# Patient Record
Sex: MALE | Race: WHITE | NOT HISPANIC OR LATINO | Employment: OTHER | URBAN - METROPOLITAN AREA
[De-identification: names, ages, dates, MRNs, and addresses within clinical notes are randomized per-mention and may not be internally consistent; named-entity substitution may affect disease eponyms.]

---

## 2022-09-05 ENCOUNTER — HOSPITAL ENCOUNTER (EMERGENCY)
Facility: HOSPITAL | Age: 77
Discharge: HOME | End: 2022-09-05
Attending: EMERGENCY MEDICINE | Admitting: EMERGENCY MEDICINE

## 2022-09-05 ENCOUNTER — APPOINTMENT (OUTPATIENT)
Dept: GENERAL RADIOLOGY | Facility: HOSPITAL | Age: 77
End: 2022-09-05

## 2022-09-05 VITALS
HEART RATE: 105 BPM | HEIGHT: 70 IN | RESPIRATION RATE: 18 BRPM | DIASTOLIC BLOOD PRESSURE: 75 MMHG | BODY MASS INDEX: 26.48 KG/M2 | WEIGHT: 185 LBS | SYSTOLIC BLOOD PRESSURE: 117 MMHG | TEMPERATURE: 97.8 F | OXYGEN SATURATION: 95 %

## 2022-09-05 DIAGNOSIS — G89.29 OTHER CHRONIC PAIN: Primary | ICD-10-CM

## 2022-09-05 DIAGNOSIS — M17.0 OSTEOARTHRITIS OF BOTH KNEES, UNSPECIFIED OSTEOARTHRITIS TYPE: ICD-10-CM

## 2022-09-05 PROCEDURE — 99283 EMERGENCY DEPT VISIT LOW MDM: CPT

## 2022-09-05 PROCEDURE — 73130 X-RAY EXAM OF HAND: CPT

## 2022-09-05 PROCEDURE — 73562 X-RAY EXAM OF KNEE 3: CPT

## 2022-09-05 RX ORDER — PREDNISONE 20 MG/1
60 TABLET ORAL DAILY
Qty: 15 TABLET | Refills: 0 | Status: SHIPPED | OUTPATIENT
Start: 2022-09-05 | End: 2022-09-10

## 2022-09-05 RX ORDER — TAMSULOSIN HYDROCHLORIDE 0.4 MG/1
1 CAPSULE ORAL DAILY
COMMUNITY

## 2022-09-05 RX ORDER — LISINOPRIL 20 MG/1
20 TABLET ORAL DAILY
COMMUNITY
End: 2023-03-19

## 2022-09-05 RX ORDER — ACETAMINOPHEN 500 MG
1000 TABLET ORAL ONCE
Status: COMPLETED | OUTPATIENT
Start: 2022-09-05 | End: 2022-09-05

## 2022-09-05 RX ORDER — PRAVASTATIN SODIUM 40 MG
40 TABLET ORAL DAILY
COMMUNITY
End: 2023-03-19

## 2022-09-05 RX ADMIN — ACETAMINOPHEN 1000 MG: 500 TABLET ORAL at 16:15

## 2022-09-05 NOTE — ED PROVIDER NOTES
Subjective   77-year-old male presents with right knee pain.  Also notes left hand pain further and or questioning.  Patient states that he has had a few falls recently.  He did not have a fall today but states the pain in his knee keeps getting worse to the point where he is having trouble walking.  Reports he has history of arthritis, used to be on hydrocodone for this, but has been off of this for several years.  Reports he has not taken anything for the pain recently.  No medications today.  No numbness or tingling.  Patient has crutches and walker but states his apartment is too crowded to use them so he usually does not.  He has otherwise felt well.          Review of Systems   All other systems reviewed and are negative.      Past Medical History:   Diagnosis Date   • Diabetes mellitus (HCC)    • Hyperlipidemia    • Hypertension        No Known Allergies    Past Surgical History:   Procedure Laterality Date   • APPENDECTOMY     • CHOLECYSTECTOMY         History reviewed. No pertinent family history.    Social History     Socioeconomic History   • Marital status:    Tobacco Use   • Smoking status: Current Every Day Smoker     Packs/day: 0.50   Substance and Sexual Activity   • Alcohol use: No   • Drug use: No   • Sexual activity: Defer           Objective   Physical Exam  Constitutional:       General: He is not in acute distress.     Appearance: Normal appearance. He is not ill-appearing, toxic-appearing or diaphoretic.   HENT:      Head: Normocephalic and atraumatic.      Nose: Nose normal. No congestion.      Mouth/Throat:      Mouth: Mucous membranes are moist.      Pharynx: Oropharynx is clear.   Eyes:      Extraocular Movements: Extraocular movements intact.      Pupils: Pupils are equal, round, and reactive to light.   Cardiovascular:      Rate and Rhythm: Normal rate and regular rhythm.      Pulses: Normal pulses.      Heart sounds: Normal heart sounds.   Pulmonary:      Effort: Pulmonary effort  is normal. No respiratory distress.      Breath sounds: Normal breath sounds.   Abdominal:      General: There is no distension.      Palpations: Abdomen is soft.      Tenderness: There is no abdominal tenderness.   Musculoskeletal:      Comments: No midline neck or back tenderness.  No tenderness to chest wall.  Tenderness to dorsum of left hand without any significant swelling, ecchymosis, deformity.   strength of left hand slightly decreased secondary to pain but otherwise is neurovascular intact.  No tenderness over wrist, elbow, shoulder.  Right upper extremity within normal limits.  Bilateral lower extremities with chronic bony changes to knees, limited range of motion of knees, but normal strength and sensation distally.  2+ PT pulses bilaterally.   Skin:     General: Skin is warm and dry.      Comments: Intact   Neurological:      General: No focal deficit present.      Mental Status: He is alert and oriented to person, place, and time. Mental status is at baseline.      Sensory: No sensory deficit.      Motor: No weakness.   Psychiatric:         Mood and Affect: Mood normal.         Behavior: Behavior normal.         Thought Content: Thought content normal.         Judgment: Judgment normal.         Procedures           ED Course  ED Course as of 09/05/22 1654   Mon Sep 05, 2022   1613 Imaging negative for acute injury.  Patient is neurovascular intact.  Sounds like this is more an exacerbation of a chronic problem.  Patient says he cannot take NSAIDs.  Recommend Tylenol but we will also prescribe short course of steroids for anti-inflammatory effects.  Did discuss risk of hyperglycemia with him.  Advised orthopedic surgery follow-up for his osteoarthritis of knees.  Discussed expected course and return precautions. [TD]      ED Course User Index  [TD] Derrek Segovia MD                                           [facility]    Final diagnoses:   Other chronic pain   Osteoarthritis of both knees, unspecified  osteoarthritis type       ED Disposition  ED Disposition     ED Disposition   Discharge    Condition   Stable    Comment   --             Wilfrid Vitale MD  [address] [facility] [address]  [place] [address] [phone]    Schedule an appointment as soon as possible for a visit in 1 week           Medication List      New Prescriptions    predniSONE 20 MG tablet  Commonly known as: DELTASONE  Take 3 tablets by mouth Daily for 5 days.           Where to Get Your Medications      These medications were sent to [facility]/pharmacy #[id number] - [place], [place] - [address] [phone] [facility] [phone]   [address] [facility] [address]    Phone: [phone]   · predniSONE 20 MG tablet          Derrek Segovia MD  09/05/22 5066

## 2023-03-15 ENCOUNTER — APPOINTMENT (OUTPATIENT)
Dept: GENERAL RADIOLOGY | Facility: HOSPITAL | Age: 78
End: 2023-03-15
Payer: MEDICARE

## 2023-03-15 ENCOUNTER — HOSPITAL ENCOUNTER (EMERGENCY)
Facility: HOSPITAL | Age: 78
Discharge: HOME | End: 2023-03-15
Attending: EMERGENCY MEDICINE | Admitting: EMERGENCY MEDICINE
Payer: MEDICARE

## 2023-03-15 VITALS
HEART RATE: 95 BPM | HEIGHT: 68 IN | DIASTOLIC BLOOD PRESSURE: 76 MMHG | OXYGEN SATURATION: 96 % | SYSTOLIC BLOOD PRESSURE: 147 MMHG | TEMPERATURE: 98.2 F | WEIGHT: 210.9 LBS | BODY MASS INDEX: 31.96 KG/M2 | RESPIRATION RATE: 18 BRPM

## 2023-03-15 DIAGNOSIS — S80.01XA CONTUSION OF RIGHT KNEE, INITIAL ENCOUNTER: ICD-10-CM
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
## 2023-03-15 PROCEDURE — 73562 X-RAY EXAM OF KNEE 3: CPT

## 2023-03-15 PROCEDURE — 99284 EMERGENCY DEPT VISIT MOD MDM: CPT

## 2023-03-15 RX ORDER — HYDROCODONE BITARTRATE AND ACETAMINOPHEN 5; 325 MG/1; MG/1
1 TABLET ORAL ONCE
Status: COMPLETED | OUTPATIENT
Start: 2023-03-15 | End: 2023-03-15

## 2023-03-15 RX ADMIN — HYDROCODONE BITARTRATE AND ACETAMINOPHEN 1 TABLET: 5; 325 TABLET ORAL at 12:37

## 2023-03-15 NOTE — ED PROVIDER NOTES
Subjective   History of Present Illness  Patient presents complaining of a fall that happened yesterday around 4 PM.  Patient was out walking on the sidewalk and said his right knee gave out and he fell straight down.  Patient came down on his knees.  Patient denies any head trauma or other injury.  Patient was able to get back up and reports pain with walking now.  No previous knee surgery, fracture, or dislocation.        Review of Systems   All other systems reviewed and are negative.      Past Medical History:   Diagnosis Date   • Diabetes mellitus (HCC)    • Hyperlipidemia    • Hypertension        No Known Allergies    Past Surgical History:   Procedure Laterality Date   • APPENDECTOMY     • CHOLECYSTECTOMY         History reviewed. No pertinent family history.    Social History     Socioeconomic History   • Marital status:    Tobacco Use   • Smoking status: Every Day     Packs/day: 0.50     Types: Cigarettes   Substance and Sexual Activity   • Alcohol use: No   • Drug use: No   • Sexual activity: Defer           Objective   Physical Exam  Vitals and nursing note reviewed.   HENT:      Head: Normocephalic and atraumatic.      Right Ear: External ear normal.      Left Ear: External ear normal.   Eyes:      Conjunctiva/sclera: Conjunctivae normal.   Neck:      Comments: No cervical, thoracic, or lumbar spinal tenderness.  Cardiovascular:      Rate and Rhythm: Normal rate and regular rhythm.      Pulses:           Radial pulses are 2+ on the right side and 2+ on the left side.        Dorsalis pedis pulses are 2+ on the right side and 2+ on the left side.        Posterior tibial pulses are 2+ on the right side and 2+ on the left side.      Heart sounds: Normal heart sounds.   Pulmonary:      Effort: Pulmonary effort is normal.      Breath sounds: Normal breath sounds.   Musculoskeletal:      Cervical back: Normal range of motion and neck supple.      Comments: Tenderness to bilateral anterior knees overlying  the patella.  Right knee has a small 2 x 2 centimeter area of superficial abrasion in the distal anterior knee.  Good catch point felt with anterior posterior drawer bilaterally.  Patient has full range of motion of bilateral ankles, knees, and hips.   Skin:     General: Skin is warm and dry.      Capillary Refill: Capillary refill takes 2 to 3 seconds.   Neurological:      Mental Status: He is alert and oriented to person, place, and time.      Sensory: No sensory deficit.      Motor: No weakness.   Psychiatric:         Mood and Affect: Mood normal.         Behavior: Behavior normal.         Procedures           ED Course                                           Medical Decision Making  ddx -contusion, sprain, strain, abrasion, dislocation, fracture    XR Knee 3 View Left    Result Date: 3/15/2023  1.  No acute osseous abnormality. 2.  Tricompartment degenerative arthropathy, greatest at the patellofemoral joint.  This report was finalized on 3/15/2023 12:45 PM by Dr. Donnell Baer MD.      XR Knee 3 View Right    Result Date: 3/15/2023  1. No acute osseous abnormality. 2. Large knee joint effusion. 3. Advanced degenerative arthropathy.  This report was finalized on 3/15/2023 12:47 PM by Dr. Donnell Baer MD.      1320 Pt seen again prior to d/c.  Imaging reviewed and are unremarkable.   All questions personally answered at the bedside and all d/c instructions personally reviewed with pt.  Discussed the importance of close outpt. f/u and pt. understands this and agrees to do so.  Pt agrees to return to ED immediately for any new, persistent, or worsening symptoms.    EMR Dragon/Transcription disclaimer:  Much of this encounter note is an electronic transcription/translation of spoken language to printed text, aka voice recognition.  The electronic translation of spoken language may permit erroneous or at times nonsensical words or phrases to be inadvertently transcribed; although I have reviewed the note  for such errors, some may still exist so please interpret based on surrounding text content.      Amount and/or Complexity of Data Reviewed  Radiology: ordered.      Risk  Prescription drug management.          Final diagnoses:   Fall, initial encounter   Contusion of right knee, initial encounter       ED Disposition  ED Disposition     ED Disposition   Discharge    Condition   Stable    Comment   --             Genaro Mae MD  [address] [facility] [address] [facility] [address] [facility]  [place] [address]  [phone]    In 3 days           Medication List      No changes were made to your prescriptions during this visit.          Evan Ruiz MD  03/15/23 3713

## 2023-03-19 ENCOUNTER — HOSPITAL ENCOUNTER (EMERGENCY)
Facility: HOSPITAL | Age: 78
Discharge: HOME | End: 2023-03-19
Attending: EMERGENCY MEDICINE | Admitting: EMERGENCY MEDICINE
Payer: MEDICARE

## 2023-03-19 ENCOUNTER — APPOINTMENT (OUTPATIENT)
Dept: GENERAL RADIOLOGY | Facility: HOSPITAL | Age: 78
End: 2023-03-19
Payer: MEDICARE

## 2023-03-19 ENCOUNTER — APPOINTMENT (OUTPATIENT)
Dept: CT IMAGING | Facility: HOSPITAL | Age: 78
End: 2023-03-19
Payer: MEDICARE

## 2023-03-19 VITALS
BODY MASS INDEX: 30.77 KG/M2 | DIASTOLIC BLOOD PRESSURE: 81 MMHG | HEIGHT: 68 IN | HEART RATE: 91 BPM | WEIGHT: 203 LBS | TEMPERATURE: 98 F | RESPIRATION RATE: 18 BRPM | OXYGEN SATURATION: 99 % | SYSTOLIC BLOOD PRESSURE: 136 MMHG

## 2023-03-19 DIAGNOSIS — M79.18 MUSCULOSKELETAL PAIN: Primary | ICD-10-CM
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
## 2023-03-19 PROCEDURE — 99284 EMERGENCY DEPT VISIT MOD MDM: CPT

## 2023-03-19 PROCEDURE — 72131 CT LUMBAR SPINE W/O DYE: CPT

## 2023-03-19 PROCEDURE — 72128 CT CHEST SPINE W/O DYE: CPT

## 2023-03-19 PROCEDURE — 72125 CT NECK SPINE W/O DYE: CPT

## 2023-03-19 PROCEDURE — 70450 CT HEAD/BRAIN W/O DYE: CPT

## 2023-03-19 PROCEDURE — 73562 X-RAY EXAM OF KNEE 3: CPT

## 2023-03-19 RX ORDER — PRAVASTATIN SODIUM 20 MG
1 TABLET ORAL DAILY
COMMUNITY
Start: 2022-10-20

## 2023-03-19 RX ORDER — HYDROCODONE BITARTRATE AND ACETAMINOPHEN 5; 325 MG/1; MG/1
0.5 TABLET ORAL ONCE
Status: COMPLETED | OUTPATIENT
Start: 2023-03-19 | End: 2023-03-19

## 2023-03-19 RX ORDER — LISINOPRIL 5 MG/1
1 TABLET ORAL DAILY
COMMUNITY
Start: 2023-03-05

## 2023-03-19 RX ORDER — IBUPROFEN 400 MG/1
400 TABLET ORAL EVERY 8 HOURS PRN
Qty: 21 TABLET | Refills: 0 | Status: SHIPPED | OUTPATIENT
Start: 2023-03-19 | End: 2023-03-22 | Stop reason: SDUPTHER

## 2023-03-19 RX ADMIN — HYDROCODONE BITARTRATE AND ACETAMINOPHEN 0.5 TABLET: 5; 325 TABLET ORAL at 13:43

## 2023-03-19 NOTE — ED PROVIDER NOTES
Subjective   History of Present Illness  77-year-old male past medical history significant for hypertension hyperlipidemia and diabetes who presents emergency room via ambulance secondary to back pain.  Patient states that he fell several days ago on his knees and since that time he has been laying in the bed because his knees hurt and his back hurts because of that.  Patient states that he has been taking Aleve for his symptoms up until last night when he ran out of of his medication.  Patient states that he is able to get up to walk to the bathroom when he needs to and that he eats food that his wife brings him.  At other times during exam he states that he is able to get around the house when he wants to.  Patient states that his right knee still hurts and that he has pain in his back all the way from the bottom up to his neck.  Patient does not report trauma to his back except for the fall that occurred several days ago onto his knees.  Patient was seen in this emergency room on 3/15/2023 for a fall with bilateral anterior knee pain.  Patient denies chest pain shortness of breath fever cough headache visual changes anorexia nausea vomiting diarrhea.  Patient states that he has been able to take all his medications as directed.        Review of Systems   Constitutional: Negative for activity change, appetite change, chills, diaphoresis, fatigue and fever.   HENT: Negative for congestion, sinus pressure, sneezing and sore throat.    Eyes: Negative for photophobia and visual disturbance.   Respiratory: Negative for cough and shortness of breath.    Cardiovascular: Negative for chest pain, palpitations and leg swelling.   Gastrointestinal: Negative for abdominal distention, abdominal pain, diarrhea, nausea and vomiting.   Genitourinary: Negative for dysuria and flank pain.   Musculoskeletal: Positive for back pain. Negative for arthralgias and myalgias.        Right knee pain   Skin: Negative for rash.    Neurological: Negative for dizziness, weakness and headaches.   Psychiatric/Behavioral: Negative for behavioral problems and confusion.       Past Medical History:   Diagnosis Date   • Diabetes mellitus (HCC)    • Hyperlipidemia    • Hypertension        Allergies   Allergen Reactions   • Naproxen Swelling       Past Surgical History:   Procedure Laterality Date   • APPENDECTOMY     • CHOLECYSTECTOMY         History reviewed. No pertinent family history.    Social History     Socioeconomic History   • Marital status:    Tobacco Use   • Smoking status: Every Day     Packs/day: 0.50     Types: Cigarettes   Substance and Sexual Activity   • Alcohol use: No   • Drug use: No   • Sexual activity: Defer           Objective   Physical Exam  Vitals and nursing note reviewed.   Constitutional:       General: He is not in acute distress.     Appearance: Normal appearance. He is not toxic-appearing or diaphoretic.   HENT:      Head: Normocephalic and atraumatic.      Mouth/Throat:      Mouth: Mucous membranes are moist.   Eyes:      Conjunctiva/sclera: Conjunctivae normal.   Cardiovascular:      Rate and Rhythm: Normal rate and regular rhythm.      Pulses: Normal pulses.   Pulmonary:      Effort: Pulmonary effort is normal. No respiratory distress.      Breath sounds: Normal breath sounds. No wheezing or rales.   Abdominal:      General: Abdomen is flat. There is no distension.      Tenderness: There is no abdominal tenderness.   Musculoskeletal:         General: No swelling or signs of injury. Normal range of motion.      Cervical back: Normal range of motion and neck supple.        Legs:       Comments: Patient has well-healing superficial abrasions on bilateral anterior knees approximately 2 x 2 cm in diameter as seen on diagram.  Patient's range of motion normal in left mildly diminished on the right secondary to pain.  Anterior drawer test is within normal limits bilaterally.      Patient is leaned to the side in  the bed and is able to roll to his left completely without difficulty.  Patient has no bony tenderness of the cervical thoracic or lumbar spine.  He does have mild paraspinal tenderness to palpation over the cervical thoracic and lumbar spine.  Patient is able to sit up in bed only with assistance secondary to patient's pain however once sitting up he is able to hold himself up without difficulty.   Skin:     General: Skin is warm and dry.      Findings: No rash.   Neurological:      General: No focal deficit present.      Mental Status: He is alert.   Psychiatric:         Mood and Affect: Mood normal.         Procedures           ED Course  ED Course as of 03/19/23 1424   Sun Mar 19, 2023   1413 X-ray knee 3 view right:  IMPRESSION:     1. No fracture or dislocation.  2. Severe tricompartmental osteoarthritis.  3. Decreased bone mineralization.  4. Possible trace effusion. [BH]   1414 CT head without contrast:  IMPRESSION:  Senescent changes without acute abnormality. [BH]   1414 CT thoracic spine without contrast:  IMPRESSION:     1. No fracture or spondylolisthesis.  2. Diffuse idiopathic skeletal hyperostosis.  3. Decrease bone mineralization.  4. Large nodule in the left thyroid lobe. Nonemergent thyroid ultrasound can characterize.  5. Left lower lobe pulmonary nodule measuring 4 mm. Lung nodule management recommendation: Based on current published guidelines, uncomplicated pulmonary nodules less than 6 mm do not require CT follow-up. In high risk patients, follow-up CT in 12 months  is optional. (Fleischner Society guidelines, 2017). [BH]   1414 CT lumbar spine without contrast:  IMPRESSION:  No acute traumatic findings.     Severe multilevel central canal stenosis due to a combination of circumferential disc bulge and facet arthropathy as detailed. [BH]   1415 CT C-spine without contrast:  IMPRESSION:  No acute traumatic findings. Multilevel degenerative disc disease and facet arthropathy with foraminal  stenosis as detailed. []   1415 Discussed findings with the patient specifically no acute abnormalities noted.  Discussed with patient need to follow with primary care physician for possible right lumbar radiculopathy as well as informed him of the 4 mm pulmonary nodule.  Discussed with him the fact that does not need follow-up however if he prefers he can talk to his primary care physician to have routine follow-up in 6 months.  Patient states he understands agrees the plan. []   1423 Noted that patient is listing naproxen as an allergy.  He originally told me that he took Aleve until last night when he ran out but now is stating that its Advil that he has been taking until he ran out last night.  Patient states that he the Aleve causes his feet to swell.  Will prescribe patient ibuprofen as he is taking it without difficulty. []      ED Course User Index  [] Michael Williamson MD                                           Medical Decision Making  My diagnosis for lower extremity pain and injury includes but is not limited to hip fracture, femur fracture, hip dislocation, hip contusion, hip sprain, hip strain, pelvic fracture, ischio-tibial band pain, ischio-tibial band bursitis, knee sprain, patella dislocation, knee dislocation, internal derangement of knee, fractures of the femur, tibia, fibula, ankle, foot and digits, ankle sprain, ankle dislocation, Lisfranc fracture, fracture dislocations of the digits, pulmonary embolism, claudication, peripheral vascular disease, gout, osteoarthritis, rheumatoid arthritis, bursitis, septic joint, poly-rheumatica, polyarthralgia and other inflammatory or infectious disease processes.    My differential diagnosis for back pain includes but is not limited to:  Musculoskeletal strain, contusion, retroperitoneal hematoma, disc protrusion, vertebral fracture, transverse process fracture, rib fracture, facet syndrome, sacroiliac joint strain, sciatica, renal injury,  splenic injury, pancreatic injury, osteoarthritis, lumbar spondylosis, spinal stenosis, ankylosing spondylitis, sacroiliac joint inflammation, pancreatitis, perforated peptic ulcer, diverticulitis, endometriosis, chronic PID, epidural abscess, osteomyelitis, retroperitoneal abscess, pyelonephritis, pneumonia, subphrenic abscess, tuberculosis, neurofibroma, meningioma, multiple myeloma, lymphoma, metastatic cancer, primary cancer, AAA, aortic dissection, spinal ischemia, referred pain, ureterolithiasis        Final diagnoses:   Musculoskeletal pain   Fall, initial encounter       ED Disposition  ED Disposition     ED Disposition   Discharge    Condition   Stable    Comment   --             Genaro Mae MD  [address] [place] [address]  [phone]    Schedule an appointment as soon as possible for a visit       [facility]  [address] [place] [id number] [phone]  Go to   As needed         Medication List      New Prescriptions    ibuprofen 400 MG tablet  Commonly known as: ADVIL,MOTRIN  Take 1 tablet by mouth Every 8 (Eight) Hours As Needed for Mild Pain or Moderate Pain.           Where to Get Your Medications      These medications were sent to [facility] - [facility] to [facility] - [phone] [facility] [phone] [facility], [place] [address]    Phone: [phone]   · ibuprofen 400 MG tablet          Michael Williamson MD  03/19/23 3396

## 2023-03-22 RX ORDER — IBUPROFEN 400 MG/1
400 TABLET ORAL EVERY 8 HOURS PRN
Qty: 21 TABLET | Refills: 0 | Status: SHIPPED | OUTPATIENT
Start: 2023-03-22

## 2024-04-18 ENCOUNTER — APPOINTMENT (OUTPATIENT)
Dept: CT IMAGING | Facility: HOSPITAL | Age: 79
End: 2024-04-18
Payer: MEDICARE

## 2024-04-18 ENCOUNTER — APPOINTMENT (OUTPATIENT)
Dept: GENERAL RADIOLOGY | Facility: HOSPITAL | Age: 79
End: 2024-04-18
Payer: MEDICARE

## 2024-04-18 ENCOUNTER — HOSPITAL ENCOUNTER (EMERGENCY)
Facility: HOSPITAL | Age: 79
Discharge: HOME | End: 2024-04-18
Attending: STUDENT IN AN ORGANIZED HEALTH CARE EDUCATION/TRAINING PROGRAM
Payer: MEDICARE

## 2024-04-18 VITALS
HEART RATE: 90 BPM | RESPIRATION RATE: 16 BRPM | SYSTOLIC BLOOD PRESSURE: 115 MMHG | DIASTOLIC BLOOD PRESSURE: 72 MMHG | HEIGHT: 68 IN | OXYGEN SATURATION: 93 % | BODY MASS INDEX: 34.86 KG/M2 | WEIGHT: 230 LBS | TEMPERATURE: 97.6 F
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
## 2024-04-18 DIAGNOSIS — M16.12 OSTEOARTHRITIS OF LEFT HIP, UNSPECIFIED OSTEOARTHRITIS TYPE: ICD-10-CM

## 2024-04-18 DIAGNOSIS — M25.552 LEFT HIP PAIN: Primary | ICD-10-CM

## 2024-04-18 DIAGNOSIS — S86.912A KNEE STRAIN, LEFT, INITIAL ENCOUNTER: ICD-10-CM

## 2024-04-18 PROCEDURE — 72192 CT PELVIS W/O DYE: CPT

## 2024-04-18 PROCEDURE — 73562 X-RAY EXAM OF KNEE 3: CPT

## 2024-04-18 PROCEDURE — 99284 EMERGENCY DEPT VISIT MOD MDM: CPT

## 2024-04-18 PROCEDURE — 72170 X-RAY EXAM OF PELVIS: CPT

## 2024-04-18 RX ORDER — ACETAMINOPHEN 500 MG
1000 TABLET ORAL ONCE
Status: COMPLETED | OUTPATIENT
Start: 2024-04-18 | End: 2024-04-18

## 2024-04-18 RX ADMIN — ACETAMINOPHEN 1000 MG: 500 TABLET ORAL at 16:12

## 2024-04-18 NOTE — ED PROVIDER NOTES
Subjective   History of Present Illness  This is a 78-year-old male who presents to the emergency department after a fall that occurred about 11:00 this morning.  The patient fell and he is getting out of his car.  He states that he has chronic back pain which is unchanged but is mostly complaining of pain to the left lateral hip as well as the left knee, he describes these as mild.  Denies any loss of sensation, head injury, neck pain, shortness of breath, numbness, tingling or loss of sensation.        Review of Systems   Constitutional:  Negative for activity change, chills, diaphoresis and fever.   HENT: Negative.     Respiratory: Negative.     Cardiovascular: Negative.    Skin: Negative.    Neurological: Negative.    All other systems reviewed and are negative.      Past Medical History:   Diagnosis Date    Diabetes mellitus     Hyperlipidemia     Hypertension        Allergies   Allergen Reactions    Naproxen Swelling       Past Surgical History:   Procedure Laterality Date    APPENDECTOMY      CHOLECYSTECTOMY         History reviewed. No pertinent family history.    Social History     Socioeconomic History    Marital status:    Tobacco Use    Smoking status: Every Day     Current packs/day: 0.50     Types: Cigarettes   Substance and Sexual Activity    Alcohol use: No    Drug use: No    Sexual activity: Defer           Objective   Physical Exam  Vitals and nursing note reviewed.   Constitutional:       Appearance: Normal appearance. He is normal weight. He is not ill-appearing, toxic-appearing or diaphoretic.   HENT:      Head: Normocephalic and atraumatic.      Right Ear: External ear normal.      Left Ear: External ear normal.      Nose: Nose normal. No congestion or rhinorrhea.      Mouth/Throat:      Pharynx: No oropharyngeal exudate or posterior oropharyngeal erythema.   Eyes:      Extraocular Movements: Extraocular movements intact.      Conjunctiva/sclera: Conjunctivae normal.      Pupils: Pupils  are equal, round, and reactive to light.   Cardiovascular:      Rate and Rhythm: Normal rate and regular rhythm.      Pulses: Normal pulses.   Pulmonary:      Effort: Pulmonary effort is normal.      Breath sounds: Normal breath sounds. No stridor. No wheezing, rhonchi or rales.   Chest:      Chest wall: No tenderness.   Abdominal:      General: There is no distension.      Palpations: Abdomen is soft. There is no mass.   Musculoskeletal:         General: No swelling, tenderness or signs of injury. Normal range of motion.      Cervical back: Normal range of motion. No rigidity or tenderness.      Comments: Evaluation of the left lateral hip there is no ecchymosis, very minimal tenderness to the lateral aspect of the left hip with no obvious deformity.  No shortening, no rotation in the internal or external fashion.  Patient's left knee has full range of motion actively, negative Lachman, negative AP drawer, strength is at his baseline.  Sensation grossly intact throughout, compartments are soft,   Skin:     General: Skin is warm.      Capillary Refill: Capillary refill takes less than 2 seconds.      Coloration: Skin is not jaundiced or pale.      Findings: No bruising.   Neurological:      General: No focal deficit present.      Mental Status: He is alert and oriented to person, place, and time. Mental status is at baseline.      Motor: No weakness.         Procedures           ED Course                                             Medical Decision Making    MDM:    Escalation of care including admission/observation considered    - Discussions of management with other providers:  None    - Discussed/reviewed with Radiology regarding test interpretation    - Independent interpretation: XR    - Additional patient history obtained from: None and EMS    - Review of external non-ED record (if available):  Prior Inpt record, Office record, Outpt record, Prior Outpt labs, PCP record, Outside ED record, Other    - Chronic  conditions affecting care: See HPI and medical Hx.    - Social Determinants of health significantly affecting care:  None        Medical Decision Making Discussion:    This is a pleasant 78-year-old presents to the emergency department with complaints of left hip and left knee pain after he fell getting out of his car earlier.  He did not hit his head or lose consciousness.  This was several hours ago.  The patient has full range of motion although limited secondary likely to age-related changes and osteoarthritis however, x-rays were obtained of the knee and hip, questionable possible fracture of the left hip on x-ray therefore CT obtained.  There is no evidence, on CT scan, of acute fracture or malalignment.  The patient is otherwise well-appearing.  He states that he has his pain control completely with Tylenol.  Otherwise well-appearing and at his baseline.  His grandson is here and the patient will be discharged to the care of his grandson.  They are encouraged to follow-up outpatient with PCP and as needed orthopedics.  They will continue to manage his osteoarthritis on an outpatient basis.  Otherwise well-appearing, remains neurovascularly intact and no evidence of other abnormality or acute trauma.  Stable for discharge.    The patient has been given very strict return precautions to return to the emergency department should there be any acute change or worsening of their condition.  I have explained my findings and the patient has expressed understanding to me.  I explained that the work-up performed in the ED has been based on the specific complaint and concern, as the nature of emergency medicine is complaint driven and they understand that new symptoms may arise.  I have told them that, should there be any new symptoms, worsening or changing symptoms, a new work-up may be indicated that they are encouraged to return to the emergency department or promptly contact their primary care physician. We have  employed a shared decision-making process as the discussion of their disposition.  The patient has been educated as to the nature of the visit, the tests and work-up performed and the findings from today's visit. At this time, there does not appear to be any acute emergent process that necessitates admission to the hospital, however, the patient understands that this can change unexpectedly. At this time, the patient is stable for discharge home and agrees to follow-up with her primary care physician in the next 24 to 48 hours or earlier should they be able to obtain an appointment.    The patient was counseled regarding diagnostic results and treatment plan and patient has indicated understanding of these instructions.      Amount and/or Complexity of Data Reviewed  Radiology: ordered.    Risk  OTC drugs.        Final diagnoses:   Left hip pain   Fall, initial encounter   Osteoarthritis of left hip, unspecified osteoarthritis type   Knee strain, left, initial encounter       ED Disposition  ED Disposition       ED Disposition   Discharge    Condition   Stable    Comment   --               Genaro Mae MD  [address] [place] [id number]  [phone]    In 1 week  As needed         Medication List      No changes were made to your prescriptions during this visit.            Mario Majano DO  04/18/24 1720

## 2024-10-25 ENCOUNTER — LAB REQUISITION (OUTPATIENT)
Dept: LAB | Facility: HOSPITAL | Age: 79
End: 2024-10-25
Payer: MEDICARE

## 2024-10-25 DIAGNOSIS — N39.0 URINARY TRACT INFECTION, SITE NOT SPECIFIED: ICD-10-CM

## 2024-10-25 LAB
BACTERIA UR QL AUTO: ABNORMAL /HPF
BILIRUB UR QL STRIP: NEGATIVE
CLARITY UR: ABNORMAL
COLOR UR: ABNORMAL
GLUCOSE UR STRIP-MCNC: NEGATIVE MG/DL
HGB UR QL STRIP.AUTO: ABNORMAL
HOLD SPECIMEN: NORMAL
HYALINE CASTS UR QL AUTO: ABNORMAL /LPF
KETONES UR QL STRIP: ABNORMAL
LEUKOCYTE ESTERASE UR QL STRIP.AUTO: ABNORMAL
NITRITE UR QL STRIP: NEGATIVE
PH UR STRIP.AUTO: 8 [PH] (ref 5–8)
PROT UR QL STRIP: ABNORMAL
RBC # UR STRIP: ABNORMAL /HPF
REF LAB TEST METHOD: ABNORMAL
SP GR UR STRIP: 1.03 (ref 1–1.03)
SQUAMOUS #/AREA URNS HPF: ABNORMAL /HPF
UROBILINOGEN UR QL STRIP: ABNORMAL
WBC # UR STRIP: ABNORMAL /HPF

## 2024-10-25 PROCEDURE — 87186 SC STD MICRODIL/AGAR DIL: CPT | Performed by: FAMILY MEDICINE

## 2024-10-25 PROCEDURE — 87086 URINE CULTURE/COLONY COUNT: CPT | Performed by: FAMILY MEDICINE

## 2024-10-25 PROCEDURE — 81001 URINALYSIS AUTO W/SCOPE: CPT | Performed by: FAMILY MEDICINE

## 2024-10-27 LAB
BACTERIA SPEC AEROBE CULT: ABNORMAL
BACTERIA SPEC AEROBE CULT: ABNORMAL

## 2024-12-31 ENCOUNTER — APPOINTMENT (OUTPATIENT)
Dept: GENERAL RADIOLOGY | Facility: HOSPITAL | Age: 79
End: 2024-12-31
Payer: MEDICARE

## 2024-12-31 ENCOUNTER — HOSPITAL ENCOUNTER (EMERGENCY)
Facility: HOSPITAL | Age: 79
Discharge: NURSING, REHAB OR LONG-TERM CARE | End: 2024-12-31
Attending: EMERGENCY MEDICINE | Admitting: EMERGENCY MEDICINE
Payer: MEDICARE

## 2024-12-31 VITALS
BODY MASS INDEX: 29.55 KG/M2 | WEIGHT: 195 LBS | OXYGEN SATURATION: 95 % | HEART RATE: 85 BPM | TEMPERATURE: 98.4 F | HEIGHT: 68 IN | DIASTOLIC BLOOD PRESSURE: 73 MMHG | RESPIRATION RATE: 16 BRPM | SYSTOLIC BLOOD PRESSURE: 142 MMHG

## 2024-12-31 DIAGNOSIS — M16.12 ARTHRITIS OF LEFT HIP: Primary | ICD-10-CM

## 2024-12-31 PROCEDURE — 73502 X-RAY EXAM HIP UNI 2-3 VIEWS: CPT

## 2024-12-31 PROCEDURE — 99283 EMERGENCY DEPT VISIT LOW MDM: CPT | Performed by: EMERGENCY MEDICINE

## 2024-12-31 RX ORDER — DEXTROMETHORPHAN HBR, GUAIFENESIN 10; 100 MG/5ML; MG/5ML
5 LIQUID ORAL EVERY 12 HOURS
COMMUNITY
Start: 2024-10-17

## 2024-12-31 RX ORDER — PSEUDOEPHED/ACETAMINOPH/DIPHEN 30MG-500MG
500 TABLET ORAL 4 TIMES DAILY PRN
COMMUNITY
Start: 2024-10-17

## 2024-12-31 RX ORDER — TRAMADOL HYDROCHLORIDE 50 MG/1
50 TABLET ORAL EVERY 6 HOURS PRN
COMMUNITY
Start: 2024-10-17

## 2024-12-31 RX ORDER — HYDROCODONE BITARTRATE AND ACETAMINOPHEN 5; 325 MG/1; MG/1
1 TABLET ORAL ONCE
Status: COMPLETED | OUTPATIENT
Start: 2024-12-31 | End: 2024-12-31

## 2024-12-31 RX ADMIN — HYDROCODONE BITARTRATE AND ACETAMINOPHEN 1 TABLET: 5; 325 TABLET ORAL at 03:20

## 2024-12-31 NOTE — DISCHARGE INSTRUCTIONS
Continue to take your Ultram as needed as prescribed.  Call Mckenna Nguyen this morning for follow-up appointment within 1 week.  Return to the emergency department if there is increasing pain or worsening way at all.

## 2024-12-31 NOTE — ED PROVIDER NOTES
Subjective   History of Present Illness    Chief complaint: Hip pain    Location: Left hip    Quality/Severity: Patient unable to qualify    Timing/Onset/Duration: 8 days ago    Modifying Factors: Hurts to move    Associated Symptoms: No numbness, tingling, or weakness.    Narrative: This 79-year-old white male presents with left hip pain for the last 8 days.  Patient felt [facility] 8 days ago.    Review of Systems   Constitutional:  Negative for chills and fever.   Musculoskeletal:         Left hip pain   Neurological:  Negative for weakness and numbness.       Past Medical History:   Diagnosis Date    Diabetes mellitus     Hyperlipidemia     Hypertension        Allergies   Allergen Reactions    Naproxen Swelling       Past Surgical History:   Procedure Laterality Date    APPENDECTOMY      CHOLECYSTECTOMY         No family history on file.    Social History     Socioeconomic History    Marital status:    Tobacco Use    Smoking status: Every Day     Current packs/day: 0.50     Types: Cigarettes   Substance and Sexual Activity    Alcohol use: No    Drug use: No    Sexual activity: Defer           Objective   Physical Exam  Vitals (The pulse is 97, respirations 17, /93, room air pulse ox 95%.  The temperature is 98.4 °F.) and nursing note reviewed.   Constitutional:       Appearance: Normal appearance.   Musculoskeletal:         General: Tenderness (Mild left hip tenderness) present. No swelling.   Skin:     General: Skin is warm and dry.      Capillary Refill: Capillary refill takes less than 2 seconds.      Findings: No rash.   Neurological:      Mental Status: He is alert.      Sensory: No sensory deficit.      Motor: No weakness.         Procedures           ED Course      03:08 EST, 12/31/24:  The patient's diagnosis of left hip arthritis was discussed with him.  The patient should call Mckenna Nguyen in the morning for follow-up appointment within 1 week.  He should return to the  emergency department if there is worsening pain, worsening way at all.  All patient questions were answered he will be discharged in good condition.  Patient will be given a Norco p.o. here prior to discharge.                                                 Medical Decision Making      Final diagnoses:   None       ED Disposition  ED Disposition       None            No follow-up provider specified.       Medication List      No changes were made to your prescriptions during this visit.       No orders to display     Labs Reviewed - No data to display  No results found.    Final diagnoses:   None         ED Medications:  Medications - No data to display    New Medications:     Medication List        ASK your doctor about these medications      ibuprofen 400 MG tablet  Commonly known as: ADVIL,MOTRIN  Take 1 tablet by mouth Every 8 (Eight) Hours As Needed for Mild Pain or Moderate Pain.     lisinopril 5 MG tablet  Commonly known as: PRINIVIL,ZESTRIL     metFORMIN 500 MG tablet  Commonly known as: GLUCOPHAGE     pravastatin 20 MG tablet  Commonly known as: PRAVACHOL     tamsulosin 0.4 MG capsule 24 hr capsule  Commonly known as: FLOMAX              Stopped Medications:     Medication List        ASK your doctor about these medications      ibuprofen 400 MG tablet  Commonly known as: ADVIL,MOTRIN  Take 1 tablet by mouth Every 8 (Eight) Hours As Needed for Mild Pain or Moderate Pain.     lisinopril 5 MG tablet  Commonly known as: PRINIVIL,ZESTRIL     metFORMIN 500 MG tablet  Commonly known as: GLUCOPHAGE     pravastatin 20 MG tablet  Commonly known as: PRAVACHOL     tamsulosin 0.4 MG capsule 24 hr capsule  Commonly known as: FLOMAX                   Kevin Ortiz MD  12/31/24 0518

## 2025-08-30 ENCOUNTER — APPOINTMENT (OUTPATIENT)
Dept: ULTRASOUND IMAGING | Facility: HOSPITAL | Age: 80
End: 2025-08-30
Payer: MEDICARE

## 2025-08-30 ENCOUNTER — APPOINTMENT (OUTPATIENT)
Dept: GENERAL RADIOLOGY | Facility: HOSPITAL | Age: 80
End: 2025-08-30
Payer: MEDICARE

## 2025-08-30 ENCOUNTER — HOSPITAL ENCOUNTER (EMERGENCY)
Facility: HOSPITAL | Age: 80
Discharge: HOME | End: 2025-08-30
Attending: STUDENT IN AN ORGANIZED HEALTH CARE EDUCATION/TRAINING PROGRAM
Payer: MEDICARE

## 2025-08-30 VITALS
HEART RATE: 85 BPM | HEIGHT: 72 IN | RESPIRATION RATE: 18 BRPM | OXYGEN SATURATION: 97 % | WEIGHT: 230 LBS | TEMPERATURE: 97.6 F | DIASTOLIC BLOOD PRESSURE: 78 MMHG | BODY MASS INDEX: 31.15 KG/M2 | SYSTOLIC BLOOD PRESSURE: 149 MMHG

## 2025-08-30 DIAGNOSIS — R23.3 PETECHIAE: ICD-10-CM

## 2025-08-30 DIAGNOSIS — D69.6 THROMBOCYTOPENIA: Primary | ICD-10-CM

## 2025-08-30 LAB
ALBUMIN SERPL-MCNC: 4 G/DL (ref 3.5–5.2)
ALBUMIN/GLOB SERPL: 1.2 G/DL
ALP SERPL-CCNC: 103 U/L (ref 39–117)
ALT SERPL W P-5'-P-CCNC: 12 U/L (ref 1–41)
ANION GAP SERPL CALCULATED.3IONS-SCNC: 12.3 MMOL/L (ref 5–15)
AST SERPL-CCNC: 19 U/L (ref 1–40)
BASOPHILS # BLD AUTO: 0.04 10*3/MM3 (ref 0–0.2)
BASOPHILS NFR BLD AUTO: 0.6 % (ref 0–1.5)
BILIRUB SERPL-MCNC: 0.3 MG/DL (ref 0–1.2)
BUN SERPL-MCNC: 20.2 MG/DL (ref 8–23)
BUN/CREAT SERPL: 16.6 (ref 7–25)
CALCIUM SPEC-SCNC: 9.3 MG/DL (ref 8.6–10.5)
CHLORIDE SERPL-SCNC: 105 MMOL/L (ref 98–107)
CO2 SERPL-SCNC: 23.7 MMOL/L (ref 22–29)
CREAT SERPL-MCNC: 1.22 MG/DL (ref 0.76–1.27)
D DIMER PPP FEU-MCNC: 1.46 MCGFEU/ML (ref 0–0.78)
DEPRECATED RDW RBC AUTO: 46.6 FL (ref 37–54)
EGFRCR SERPLBLD CKD-EPI 2021: 60.7 ML/MIN/1.73
EOSINOPHIL # BLD AUTO: 0.27 10*3/MM3 (ref 0–0.4)
EOSINOPHIL NFR BLD AUTO: 3.7 % (ref 0.3–6.2)
ERYTHROCYTE [DISTWIDTH] IN BLOOD BY AUTOMATED COUNT: 13.6 % (ref 12.3–15.4)
FIBRINOGEN PPP-MCNC: 329 MG/DL (ref 200–400)
GLOBULIN UR ELPH-MCNC: 3.3 GM/DL
GLUCOSE SERPL-MCNC: 159 MG/DL (ref 65–99)
HCT VFR BLD AUTO: 35.7 % (ref 37.5–51)
HGB BLD-MCNC: 11.9 G/DL (ref 13–17.7)
IMM GRANULOCYTES # BLD AUTO: 0.02 10*3/MM3 (ref 0–0.05)
IMM GRANULOCYTES NFR BLD AUTO: 0.3 % (ref 0–0.5)
INR PPP: 1.05 (ref 0.9–1.1)
LYMPHOCYTES # BLD AUTO: 1.57 10*3/MM3 (ref 0.7–3.1)
LYMPHOCYTES NFR BLD AUTO: 21.8 % (ref 19.6–45.3)
MCH RBC QN AUTO: 31.3 PG (ref 26.6–33)
MCHC RBC AUTO-ENTMCNC: 33.3 G/DL (ref 31.5–35.7)
MCV RBC AUTO: 93.9 FL (ref 79–97)
MONOCYTES # BLD AUTO: 0.82 10*3/MM3 (ref 0.1–0.9)
MONOCYTES NFR BLD AUTO: 11.4 % (ref 5–12)
NEUTROPHILS NFR BLD AUTO: 4.49 10*3/MM3 (ref 1.7–7)
NEUTROPHILS NFR BLD AUTO: 62.2 % (ref 42.7–76)
NRBC BLD AUTO-RTO: 0 /100 WBC (ref 0–0.2)
PLATELET # BLD AUTO: 39 10*3/MM3 (ref 140–450)
PMV BLD AUTO: 12.6 FL (ref 6–12)
POTASSIUM SERPL-SCNC: 3.7 MMOL/L (ref 3.5–5.2)
PROT SERPL-MCNC: 7.3 G/DL (ref 6–8.5)
PROTHROMBIN TIME: 13.9 SECONDS (ref 12.1–15)
RBC # BLD AUTO: 3.8 10*6/MM3 (ref 4.14–5.8)
RBC MORPH BLD: NORMAL
SMALL PLATELETS BLD QL SMEAR: NORMAL
SODIUM SERPL-SCNC: 141 MMOL/L (ref 136–145)
WBC MORPH BLD: NORMAL
WBC NRBC COR # BLD AUTO: 7.21 10*3/MM3 (ref 3.4–10.8)

## 2025-08-30 PROCEDURE — 99284 EMERGENCY DEPT VISIT MOD MDM: CPT | Performed by: STUDENT IN AN ORGANIZED HEALTH CARE EDUCATION/TRAINING PROGRAM

## 2025-08-30 PROCEDURE — 85025 COMPLETE CBC W/AUTO DIFF WBC: CPT | Performed by: STUDENT IN AN ORGANIZED HEALTH CARE EDUCATION/TRAINING PROGRAM

## 2025-08-30 PROCEDURE — 85610 PROTHROMBIN TIME: CPT | Performed by: STUDENT IN AN ORGANIZED HEALTH CARE EDUCATION/TRAINING PROGRAM

## 2025-08-30 PROCEDURE — 73030 X-RAY EXAM OF SHOULDER: CPT

## 2025-08-30 PROCEDURE — 93970 EXTREMITY STUDY: CPT

## 2025-08-30 PROCEDURE — 85007 BL SMEAR W/DIFF WBC COUNT: CPT | Performed by: STUDENT IN AN ORGANIZED HEALTH CARE EDUCATION/TRAINING PROGRAM

## 2025-08-30 PROCEDURE — 85379 FIBRIN DEGRADATION QUANT: CPT | Performed by: STUDENT IN AN ORGANIZED HEALTH CARE EDUCATION/TRAINING PROGRAM

## 2025-08-30 PROCEDURE — 85384 FIBRINOGEN ACTIVITY: CPT | Performed by: STUDENT IN AN ORGANIZED HEALTH CARE EDUCATION/TRAINING PROGRAM

## 2025-08-30 PROCEDURE — 80053 COMPREHEN METABOLIC PANEL: CPT | Performed by: STUDENT IN AN ORGANIZED HEALTH CARE EDUCATION/TRAINING PROGRAM

## 2025-08-30 RX ORDER — FUROSEMIDE 40 MG/1
40 TABLET ORAL DAILY
COMMUNITY

## 2025-08-30 RX ORDER — GUAIFENESIN 600 MG/1
1200 TABLET, EXTENDED RELEASE ORAL 2 TIMES DAILY
COMMUNITY

## 2025-08-30 RX ORDER — ACETAMINOPHEN 500 MG
1000 TABLET ORAL ONCE
Status: COMPLETED | OUTPATIENT
Start: 2025-08-30 | End: 2025-08-30

## 2025-08-30 RX ORDER — TRAMADOL HYDROCHLORIDE 50 MG/1
50 TABLET ORAL ONCE
Status: COMPLETED | OUTPATIENT
Start: 2025-08-30 | End: 2025-08-30

## 2025-08-30 RX ORDER — LACTULOSE 10 G/15ML
10 SOLUTION ORAL DAILY PRN
COMMUNITY

## 2025-08-30 RX ORDER — GABAPENTIN 100 MG/1
100 CAPSULE ORAL 2 TIMES DAILY
COMMUNITY

## 2025-08-30 RX ORDER — UMECLIDINIUM BROMIDE AND VILANTEROL TRIFENATATE 62.5; 25 UG/1; UG/1
1 POWDER RESPIRATORY (INHALATION)
COMMUNITY

## 2025-08-30 RX ORDER — POTASSIUM CHLORIDE 1500 MG/1
20 TABLET, EXTENDED RELEASE ORAL 2 TIMES DAILY
COMMUNITY

## 2025-08-30 RX ORDER — ALBUTEROL SULFATE 90 UG/1
2 INHALANT RESPIRATORY (INHALATION) EVERY 4 HOURS PRN
COMMUNITY

## 2025-08-30 RX ADMIN — ACETAMINOPHEN 1000 MG: 500 TABLET, FILM COATED ORAL at 09:10

## 2025-08-30 RX ADMIN — TRAMADOL HYDROCHLORIDE 50 MG: 50 TABLET, COATED ORAL at 10:44
